# Patient Record
Sex: FEMALE | Race: WHITE | NOT HISPANIC OR LATINO | Employment: FULL TIME | URBAN - METROPOLITAN AREA
[De-identification: names, ages, dates, MRNs, and addresses within clinical notes are randomized per-mention and may not be internally consistent; named-entity substitution may affect disease eponyms.]

---

## 2022-11-11 ENCOUNTER — TELEPHONE (OUTPATIENT)
Dept: PSYCHIATRY | Facility: CLINIC | Age: 27
End: 2022-11-11

## 2022-11-11 NOTE — TELEPHONE ENCOUNTER
Pt called and left a voicemail wanting to be a new pt   Writer returned the call and was able to see the pt was already on the wait list

## 2022-11-11 NOTE — TELEPHONE ENCOUNTER
Pt reached out to schedule services for med mgmt and talk therapy  Pt discussed concerns so I informed pt with a couple options   Pt added to the med mgmt and talk therapy waitlist

## 2022-11-17 ENCOUNTER — TELEPHONE (OUTPATIENT)
Dept: PSYCHIATRY | Facility: CLINIC | Age: 27
End: 2022-11-17

## 2022-11-17 NOTE — TELEPHONE ENCOUNTER
Pt called in regards to verify if she is still in the wait list  Marlyn Mo was able to find her on the wait list and informed her that, as soon as there is an opening for her, she will received a call

## 2023-03-29 ENCOUNTER — TELEPHONE (OUTPATIENT)
Dept: PSYCHIATRY | Facility: CLINIC | Age: 28
End: 2023-03-29

## 2024-09-13 LAB — EXTERNAL HIV SCREEN: NORMAL

## 2024-11-02 ENCOUNTER — APPOINTMENT (EMERGENCY)
Dept: RADIOLOGY | Facility: HOSPITAL | Age: 29
End: 2024-11-02
Payer: COMMERCIAL

## 2024-11-02 ENCOUNTER — HOSPITAL ENCOUNTER (EMERGENCY)
Facility: HOSPITAL | Age: 29
Discharge: HOME | End: 2024-11-02
Attending: EMERGENCY MEDICINE
Payer: COMMERCIAL

## 2024-11-02 VITALS
RESPIRATION RATE: 18 BRPM | HEART RATE: 110 BPM | DIASTOLIC BLOOD PRESSURE: 80 MMHG | SYSTOLIC BLOOD PRESSURE: 153 MMHG | WEIGHT: 150 LBS | OXYGEN SATURATION: 96 %

## 2024-11-02 DIAGNOSIS — J18.9 PNEUMONIA: Primary | ICD-10-CM

## 2024-11-02 DIAGNOSIS — R50.9 FEBRILE ILLNESS: ICD-10-CM

## 2024-11-02 LAB
FLUAV AG UPPER RESP QL IA.RAPID: NEGATIVE
FLUBV AG UPPER RESP QL IA.RAPID: NEGATIVE
SARS-COV+SARS-COV-2 AG RESP QL IA.RAPID: NEGATIVE

## 2024-11-02 PROCEDURE — 87804 INFLUENZA ASSAY W/OPTIC: CPT

## 2024-11-02 PROCEDURE — 87811 SARS-COV-2 COVID19 W/OPTIC: CPT

## 2024-11-02 PROCEDURE — 93005 ELECTROCARDIOGRAM TRACING: CPT

## 2024-11-02 PROCEDURE — 99283 EMERGENCY DEPT VISIT LOW MDM: CPT

## 2024-11-02 PROCEDURE — 71046 X-RAY EXAM CHEST 2 VIEWS: CPT

## 2024-11-02 PROCEDURE — 99284 EMERGENCY DEPT VISIT MOD MDM: CPT | Performed by: EMERGENCY MEDICINE

## 2024-11-02 RX ORDER — DOXYCYCLINE 100 MG/1
100 CAPSULE ORAL ONCE
Status: DISCONTINUED | OUTPATIENT
Start: 2024-11-02 | End: 2024-11-02 | Stop reason: HOSPADM

## 2024-11-02 RX ORDER — LIDOCAINE HYDROCHLORIDE 20 MG/ML
15 SOLUTION OROPHARYNGEAL ONCE
Status: COMPLETED | OUTPATIENT
Start: 2024-11-02 | End: 2024-11-02

## 2024-11-02 RX ORDER — ACETAMINOPHEN 325 MG/1
650 TABLET ORAL ONCE
Status: DISCONTINUED | OUTPATIENT
Start: 2024-11-02 | End: 2024-11-02

## 2024-11-02 RX ORDER — ALBUTEROL SULFATE 90 UG/1
2 INHALANT RESPIRATORY (INHALATION) EVERY 6 HOURS PRN
Qty: 8.5 G | Refills: 0 | Status: SHIPPED | OUTPATIENT
Start: 2024-11-02

## 2024-11-02 RX ORDER — IBUPROFEN 600 MG/1
600 TABLET, FILM COATED ORAL ONCE
Status: COMPLETED | OUTPATIENT
Start: 2024-11-02 | End: 2024-11-02

## 2024-11-02 RX ORDER — IBUPROFEN 400 MG/1
400 TABLET, FILM COATED ORAL EVERY 6 HOURS PRN
Qty: 50 TABLET | Refills: 0 | Status: SHIPPED | OUTPATIENT
Start: 2024-11-02

## 2024-11-02 RX ORDER — ACETAMINOPHEN 325 MG/1
650 TABLET ORAL EVERY 6 HOURS PRN
Qty: 30 TABLET | Refills: 0 | Status: SHIPPED | OUTPATIENT
Start: 2024-11-02

## 2024-11-02 RX ORDER — OXYMETAZOLINE HYDROCHLORIDE 0.05 G/100ML
2 SPRAY NASAL ONCE
Status: COMPLETED | OUTPATIENT
Start: 2024-11-02 | End: 2024-11-02

## 2024-11-02 RX ORDER — ACETAMINOPHEN 325 MG/1
975 TABLET ORAL ONCE
Status: COMPLETED | OUTPATIENT
Start: 2024-11-02 | End: 2024-11-02

## 2024-11-02 RX ORDER — DOXYCYCLINE 100 MG/1
100 CAPSULE ORAL 2 TIMES DAILY
Qty: 14 CAPSULE | Refills: 0 | Status: SHIPPED | OUTPATIENT
Start: 2024-11-02 | End: 2024-11-09

## 2024-11-02 RX ADMIN — LIDOCAINE HYDROCHLORIDE 15 ML: 20 SOLUTION ORAL at 07:59

## 2024-11-02 RX ADMIN — ACETAMINOPHEN 975 MG: 325 TABLET, FILM COATED ORAL at 07:57

## 2024-11-02 RX ADMIN — IBUPROFEN 600 MG: 600 TABLET, FILM COATED ORAL at 07:58

## 2024-11-02 RX ADMIN — OXYMETAZOLINE HCL 2 SPRAY: 0.05 SPRAY NASAL at 08:00

## 2024-11-02 RX ADMIN — DEXAMETHASONE 10 MG: 2 TABLET ORAL at 07:57

## 2024-11-02 NOTE — DISCHARGE INSTRUCTIONS
Please follow up with your PCP. You have been prescribed Doxycycline (an antibiotic).  Please take this TWICE per day for the next SEVEN days.  Do not skip doses.  Take the full course of medication as prescribed.  Side effects of this medication include nausea, vomiting, and diarrhea - take this medication with some food and a glass of water.  You may take this with a probiotic to try and prevent the symptoms. Do not take with supplements or multivitamins. DO NOT GO IN THE SUN WHILE TAKING THIS MEDICATION.    Please return to the Emergency Department if you experience worsening of your current symptoms, difficulty breathing, inability to eat or drink, dehydration, turning blue, or any new/other concerning symptoms.

## 2024-11-02 NOTE — Clinical Note
Britat Byrd was seen and treated in our emergency department on 11/2/2024.                Diagnosis: Pneumonia    Britta  is off the rest of the shift today.    She may return on this date: 11/06/2024         If you have any questions or concerns, please don't hesitate to call.      Nat Rhodes, DO    ______________________________           _______________          _______________  Hospital Representative                              Date                                Time Provider E-Visit time total (minutes): 3

## 2024-11-02 NOTE — ED ATTENDING ATTESTATION
Final Diagnoses:     1. Pneumonia    2. Febrile illness      ED Course as of 11/03/24 0705   Sat Nov 02, 2024   0728 Procedure Note: EKG  Date/Time: 11/02/24 7:28 AM   Interpreted by: KAL MATHEWS   Indications / Diagnosis: dyspnea  ECG reviewed by me, the ED Provider: yes   The EKG demonstrates:   Rhythm:  normal sinus  Intervals: normal intervals  Axis: normal axis  QRS/Blocks: normal QRS  ST Changes: No acute ST Changes, no STD/TAWNY.  No old for comparison.     IKal MD, saw and evaluated the patient. All available labs and X-rays were ordered by me or the resident / non-physician and have been reviewed by myself. I discussed the patient with the resident / non-physician and agree with the resident's / non-physician practitioner's findings and plan as documented in the resident's / non-physician practicitioner's note, except where noted.   At this point, I agree with the current assessment done in the ED.   I was present during key portions of all procedures performed unless otherwise stated.     HPI:  NURSING TRIAGE:    This is a 29 y.o. female presenting for evaluation of viral syndrome.  Since 6 days ago has had cough congestion, feeling SOB.  No falls/trauma  No sick contacts  She has used her inhaler maybe more frequently during this time, last use before coming.  No dizziness/LH  No other meds used.  PMH: exercise-induced asthma.  PSH: none  No smoking drinking drugs  Chief Complaint   Patient presents with    Flu Symptoms     Pt reports that symptoms started on Sunday. Pt reports headache, chills, cough, CP, and SOB. Pt reports 2 months of UTI symptoms has a CT scan scheduled this week.       PHYSICAL: ASSESSMENT + PLAN:   General: VSS, NAD, awake, alert. Well-nourished, well-developed. Appears stated age.   Speaking normally in full sentences.   Head: Normocephalic, atraumatic, nontender.  Eyes: PERRL, EOM-I. No diplopia.   No hyphema.   No subconjunctival  hemorrhages.  Symmetrical lids.   ENT: Atraumatic external nose and ears.    MMM  No malocclusion. No stridor. Normal phonation. No drooling. Normal swallowing.   Neck: Symmetric, trachea midline. No JVD.  CV: mild tachycardia  +S1/S2  No murmurs or gallops  Peripheral pulses +2 throughout. No chest wall tenderness.   Lungs:   Unlabored No retractions  Frequent coughing.  CTAB, lungs sounds equal bilateral.   No tachypnea.   Abd: +BS, soft, NT/ND.   MSK:   FROM   Back:   No rashes  Skin: Dry, intact.   Neuro: AAOx3, GCS 15, CN II-XII grossly intact.   Motor grossly intact.  Psychiatric/Behavioral: Appropriate mood and affect   Exam: deferred    Vitals:    11/02/24 0656   BP: 153/80   BP Location: Right arm   Pulse: (!) 110   Resp: 18   SpO2: 96%   Weight: 68 kg (150 lb)    Viral syndrome: tylenol/toradol  Likely DC.   Sore throat is the most bothersome ? decadron.      There are no obvious limitations to social determinants of care.   Nursing note reviewed.   Vitals reviewed.   Orders placed by myself and/or advanced practitioner / resident.    Previous chart was reviewed  No language barrier.   History obtained from patient.    There are no limitations to the history obtained:     Past Medical: Past Surgical:    has a past medical history of Asthma.  has no past surgical history on file.   Social: Cardiac (Echo/Cath)   Social History     Substance and Sexual Activity   Alcohol Use Yes     Social History     Tobacco Use   Smoking Status Never   Smokeless Tobacco Never     Social History     Substance and Sexual Activity   Drug Use Not Currently    No results found for this or any previous visit.    No results found for this or any previous visit.    No results found for this or any previous visit.     Labs: Imaging:   Labs Reviewed   COVID-19/INFLUENZA A/B RAPID ANTIGEN (30 MIN.TAT) - Normal       Result Value Ref Range Status    SARS COV Rapid Antigen Negative  Negative Final    Influenza A Rapid Antigen Negative   Negative Final    Influenza B Rapid Antigen Negative  Negative Final    Narrative:     This test has been performed using the Quidel Lizbeth 2 FLU+SARS Antigen test under the Emergency Use Authorization (EUA). This test has been validated by the  and verified by the performing laboratory. The Lizbeth uses lateral flow immunofluorescent sandwich assay to detect SARS-COV, Influenza A and Influenza B Antigen.     The Quidel Lizbeth 2 SARS Antigen test does not differentiate between SARS-CoV and SARS-CoV-2.     Negative results are presumptive and may be confirmed with a molecular assay, if necessary, for patient management. Negative results do not rule out SARS-CoV-2 or influenza infection and should not be used as the sole basis for treatment or patient management decisions. A negative test result may occur if the level of antigen in a sample is below the limit of detection of this test.     Positive results are indicative of the presence of viral antigens, but do not rule out bacterial infection or co-infection with other viruses.     All test results should be used as an adjunct to clinical observations and other information available to the provider.    FOR PEDIATRIC PATIENTS - copy/paste COVID Guidelines URL to browser: [email or web address]    XR chest 2 views   ED Interpretation   Abnormal   RML PNA      Final Result      Mild haziness in the right middle lobe may be on the basis of subsegmental atelectasis versus pneumonia.            Workstation performed: [id number]            Medications: Code Status:   Medications   ibuprofen (MOTRIN) tablet 600 mg (600 mg Oral Given 11/2/24 0758)   dexamethasone (DECADRON) tablet 10 mg (10 mg Oral Given 11/2/24 0757)   Lidocaine Viscous HCl (XYLOCAINE) 2 % mucosal solution 15 mL (15 mL Swish & Swallow Given 11/2/24 0759)   oxymetazoline (AFRIN) 0.05 % nasal spray 2 spray (2 sprays Each Nare Given 11/2/24 0800)    acetaminophen (TYLENOL) tablet 975 mg (975 mg Oral Given 11/2/24 0757)    Code Status: No Order  Advance Directive and Living Will:      Power of :    POLST:       Orders Placed This Encounter   Procedures    FLU/COVID Rapid Antigen (30 min. TAT) - Preferred screening test in ED    XR chest 2 views    ECG 12 lead     Time reflects when diagnosis was documented in both MDM as applicable and the Disposition within this note       Time User Action Codes Description Comment    11/2/2024  8:43 AM Nat Harris [J18.9] Pneumonia     11/2/2024  8:43 AM Nat Harris [R50.9] Febrile illness           ED Disposition       ED Disposition   Discharge    Condition   Stable    Date/Time   Sat Nov 2, 2024  8:43 AM    Comment   Britta Byrd discharge to home/self care.                   Follow-up Information       Follow up With Specialties Details Why Contact Info Additional Information    Selene Aviles MD Internal Medicine Schedule an appointment as soon as possible for a visit   [address] [place] [address] [phone]       [employer]  Call   [phone]       [facility] Emergency Medicine Go to  If symptoms worsen [address] [place] [id number] [phone] [facility], [address] [place] [address], [address] [phone]          Discharge Medication List as of 11/2/2024  8:47 AM        START taking these medications    Details   acetaminophen (TYLENOL) 325 mg tablet Take 2 tablets (650 mg total) by mouth every 6 (six) hours as needed for mild pain or fever, Starting Sat 11/2/2024, Print      albuterol (ProAir HFA) 90 mcg/act inhaler Inhale 2 puffs every 6 (six) hours as needed for wheezing, Starting Sat 11/2/2024, Normal      doxycycline hyclate (VIBRAMYCIN) 100 mg capsule Take 1 capsule (100 mg total) by mouth 2 (two) times a day for 7 days, Starting Sat 11/2/2024,  "Until Sat 11/9/2024, Normal      ibuprofen (MOTRIN) 400 mg tablet Take 1 tablet (400 mg total) by mouth every 6 (six) hours as needed for mild pain, Starting Sat 11/2/2024, Print           No discharge procedures on file.  None                        Portions of the record may have been created with voice recognition software. Occasional wrong word or \"sound a like\" substitutions may have occurred due to the inherent limitations of voice recognition software. Read the chart carefully and recognize, using context, where substitutions have occurred.    Electronically signed by:  Kong Hernandez  "

## 2024-11-02 NOTE — ED PROVIDER NOTES
ED Disposition       None          Assessment & Plan   {Hyperlinks  Risk Stratification - NIHSS - HEART SCORE - Fill out sepsis note and make sure you call [phone] if severe or septic shock:[id number]}    MDM         Medications - No data to display    ED Risk Strat Scores                                               History of Present Illness   {Hyperlinks  History (Med, Surg, Fam, Social) - Current Medications - Allergies  :[id number]}    Chief Complaint   Patient presents with    Flu Symptoms     Pt reports that symptoms started on Sunday. Pt reports headache, chills, cough, CP, and SOB. Pt reports 2 months of UTI symptoms has a CT scan scheduled this week.        Past Medical History:   Diagnosis Date    Asthma       History reviewed. No pertinent surgical history.   History reviewed. No pertinent family history.   Social History     Tobacco Use    Smoking status: Never    Smokeless tobacco: Never   Substance Use Topics    Alcohol use: Yes    Drug use: Not Currently      E-Cigarette/Vaping      E-Cigarette/Vaping Substances      I have reviewed and agree with the history as documented.     HPI    Review of Systems        Objective   {Hyperlinks  Historical Vitals - Historical Labs - Chart Review/Microbiology - Last Echo - Code Status  :[id number]}    ED Triage Vitals [11/02/24 0656]   Temp Pulse Blood Pressure Respirations SpO2 Patient Position - Orthostatic VS   -- (!) 110 153/80 18 96 % Lying      Temp src Heart Rate Source BP Location FiO2 (%) Pain Score    -- Monitor Right arm -- 6      Vitals      Date and Time Temp Pulse SpO2 Resp BP Pain Score FACES Pain Rating User   11/02/24 0656 -- 110 96 % 18 153/80 6 -- CS            Physical Exam    Results Reviewed       None            No orders to display       Procedures    ED Medication and Procedure Management   None     Patient's Medications    No medications on file     No discharge procedures on file.  ED SEPSIS DOCUMENTATION          or more drinks on one occassion? 0 Filed at: 11/02/2024 0806   Audit-C Score 0 Filed at: 11/02/2024 0806   RACHEL: How many times in the past year have you...    Used an illegal drug or used a prescription medication for non-medical reasons? Never Filed at: 11/02/2024 0806                            History of Present Illness       Chief Complaint   Patient presents with    Flu Symptoms     Pt reports that symptoms started on Sunday. Pt reports headache, chills, cough, CP, and SOB. Pt reports 2 months of UTI symptoms has a CT scan scheduled this week.        Past Medical History:   Diagnosis Date    Asthma       History reviewed. No pertinent surgical history.   History reviewed. No pertinent family history.   Social History     Tobacco Use    Smoking status: Never    Smokeless tobacco: Never   Substance Use Topics    Alcohol use: Yes    Drug use: Not Currently      E-Cigarette/Vaping      E-Cigarette/Vaping Substances      I have reviewed and agree with the history as documented.     HPI    Patient is a 29 y.o. female with PMHx asthma who presents to the ED for evaluation of cough with shortness of breath as well as congestion, sore throat, and subjective fevers for the past six days. Patient reports increased inhaler use with intermittent and short lasting relief. No known sick contacts, recent antibiotic use, or smoking. Denies abdominal pain, nausea, vomiting, diarrhea, hematuria, rashes, or any other complaints or concerns at this time.    Review of Systems    All other systems reviewed and negative unless otherwise stated in HPI above.    Objective       ED Triage Vitals [11/02/24 0656]   Temp Pulse Blood Pressure Respirations SpO2 Patient Position - Orthostatic VS   -- (!) 110 153/80 18 96 % Lying      Temp src Heart Rate Source BP Location FiO2 (%) Pain Score    -- Monitor Right arm -- 6      Vitals      Date and Time Temp Pulse SpO2 Resp BP Pain Score FACES Pain Rating User   11/02/24 0656 -- 110 96 % 18  153/80 6 -- CS            Physical Exam  General: NAD, awake, alert.  Head: Normocephalic, atraumatic.  Eyes: EOM-I. PERRL. No scleral icterus.  ENT: Atraumatic external nose and ears. No stridor. Normal phonation.   Neck: Symmetric, trachea midline. No JVD.   CV: +tachycardia, RR. No murmurs or gallops. Peripheral pulses +2 throughout.  Lungs: Unlabored. No retractions. No tachypnea. +wheezing, lungs sounds equal bilateral.   Abd: Flat, nondistended. +BS, soft, nontender.  MSK: FROM, no deformity/injury.  Skin: Warm, dry. No rashes.  Neuro: No focal deficits. Strength and sensation grossly intact.    Results Reviewed       Procedure Component Value Units Date/Time    FLU/COVID Rapid Antigen (30 min. TAT) - Preferred screening test in ED [[id number]]  (Normal) Collected: 11/02/24 0804    Lab Status: Final result Specimen: Nares from Nose Updated: 11/02/24 0828     SARS COV Rapid Antigen Negative     Influenza A Rapid Antigen Negative     Influenza B Rapid Antigen Negative    Narrative:      This test has been performed using the Quidel Lizbeth 2 FLU+SARS Antigen test under the Emergency Use Authorization (EUA). This test has been validated by the  and verified by the performing laboratory. The Lizbeth uses lateral flow immunofluorescent sandwich assay to detect SARS-COV, Influenza A and Influenza B Antigen.     The Quidel Lizbeth 2 SARS Antigen test does not differentiate between SARS-CoV and SARS-CoV-2.     Negative results are presumptive and may be confirmed with a molecular assay, if necessary, for patient management. Negative results do not rule out SARS-CoV-2 or influenza infection and should not be used as the sole basis for treatment or patient management decisions. A negative test result may occur if the level of antigen in a sample is below the limit of detection of this test.     Positive results are indicative of the presence of viral antigens, but do not rule out bacterial infection or co-infection  with other viruses.     All test results should be used as an adjunct to clinical observations and other information available to the provider.    FOR PEDIATRIC PATIENTS - copy/paste COVID Guidelines URL to browser: [email or web address]            XR chest 2 views   ED Interpretation by Kong Hernandez MD (11/02 0840)   Abnormal   RML PNA      Final Interpretation by Orlando Oropeza MD (11/02 5448)      Mild haziness in the right middle lobe may be on the basis of subsegmental atelectasis versus pneumonia.            Workstation performed: [id number]             Procedures    ED Medication and Procedure Management   None     Discharge Medication List as of 11/2/2024  8:47 AM        START taking these medications    Details   acetaminophen (TYLENOL) 325 mg tablet Take 2 tablets (650 mg total) by mouth every 6 (six) hours as needed for mild pain or fever, Starting Sat 11/2/2024, Print      albuterol (ProAir HFA) 90 mcg/act inhaler Inhale 2 puffs every 6 (six) hours as needed for wheezing, Starting Sat 11/2/2024, Normal      doxycycline hyclate (VIBRAMYCIN) 100 mg capsule Take 1 capsule (100 mg total) by mouth 2 (two) times a day for 7 days, Starting Sat 11/2/2024, Until Sat 11/9/2024, Normal      ibuprofen (MOTRIN) 400 mg tablet Take 1 tablet (400 mg total) by mouth every 6 (six) hours as needed for mild pain, Starting Sat 11/2/2024, Print           No discharge procedures on file.  ED SEPSIS DOCUMENTATION   Time reflects when diagnosis was documented in both MDM as applicable and the Disposition within this note       Time User Action Codes Description Comment    11/2/2024  8:43 AM Nat Harris [J18.9] Pneumonia     11/2/2024  8:43 AM Nat Harris [R50.9] Febrile illness                  Nat Harris,   11/20/24 0979

## 2024-11-03 LAB
ATRIAL RATE: 100 BPM
P AXIS: 63 DEGREES
PR INTERVAL: 132 MS
QRS AXIS: 76 DEGREES
QRSD INTERVAL: 80 MS
QT INTERVAL: 350 MS
QTC INTERVAL: 451 MS
T WAVE AXIS: 43 DEGREES
VENTRICULAR RATE: 100 BPM

## 2024-11-03 PROCEDURE — 93010 ELECTROCARDIOGRAM REPORT: CPT | Performed by: INTERNAL MEDICINE

## 2024-11-03 RX ORDER — LIDOCAINE HYDROCHLORIDE 20 MG/ML
15 SOLUTION OROPHARYNGEAL 4 TIMES DAILY PRN
Qty: 100 ML | Refills: 0 | Status: SHIPPED | OUTPATIENT
Start: 2024-11-03 | End: 2024-11-08

## 2024-11-08 ENCOUNTER — HOSPITAL ENCOUNTER (OUTPATIENT)
Dept: RADIOLOGY | Facility: HOSPITAL | Age: 29
Discharge: HOME | End: 2024-11-08
Attending: ORTHOPAEDIC SURGERY
Payer: COMMERCIAL

## 2024-11-08 ENCOUNTER — OFFICE VISIT (OUTPATIENT)
Dept: OBGYN CLINIC | Facility: HOSPITAL | Age: 29
End: 2024-11-08
Payer: COMMERCIAL

## 2024-11-08 VITALS
DIASTOLIC BLOOD PRESSURE: 84 MMHG | WEIGHT: 149.91 LBS | HEIGHT: 64 IN | SYSTOLIC BLOOD PRESSURE: 119 MMHG | BODY MASS INDEX: 25.59 KG/M2 | HEART RATE: 74 BPM

## 2024-11-08 DIAGNOSIS — R52 PAIN: ICD-10-CM

## 2024-11-08 DIAGNOSIS — M46.1 SACROILIITIS (HCC): ICD-10-CM

## 2024-11-08 DIAGNOSIS — R52 PAIN: Primary | ICD-10-CM

## 2024-11-08 PROCEDURE — 99204 OFFICE O/P NEW MOD 45 MIN: CPT | Performed by: ORTHOPAEDIC SURGERY

## 2024-11-08 PROCEDURE — 72082 X-RAY EXAM ENTIRE SPI 2/3 VW: CPT

## 2024-11-08 RX ORDER — DIPHENOXYLATE HYDROCHLORIDE AND ATROPINE SULFATE 2.5; .025 MG/1; MG/1
1 TABLET ORAL DAILY
COMMUNITY

## 2024-11-08 RX ORDER — PREDNISONE 20 MG/1
2 TABLET ORAL DAILY
COMMUNITY
Start: 2024-11-04

## 2024-11-08 RX ORDER — VALACYCLOVIR HYDROCHLORIDE 1 G/1
1 TABLET, FILM COATED ORAL DAILY
COMMUNITY
Start: 2024-10-11

## 2024-11-08 NOTE — PROGRESS NOTES
Assessment & Plan/Medical Decision Makin y.o. female with Back Pain and imaging findings most notable for lumbar spondylosis         The clinical, physical and imaging findings were reviewed with the patient.  Britta  has a constellation of findings consistent with Sacroiliac Joint Dysfunction in the setting of lumbar degenerative disease, mild scoliosis.      Fortunately patient remains neurologically intact and functional.  Physical exam showing SI TTP.  We discussed the treatment options including physical therapy, at home exercises, activity modifications, chiropractic medicine, oral medications, interventional spine procedures.  At this time recommend continued conservative treatments.    Referral placed for physical therapy to work on core strengthening, lumbar ROM, strengthening, and stretching exercises.  Referral to pain management for evaluation and treatment. Discussed potential role of steroid injection at or near the source of pain to provide targeted relief.  Patient fitted for SI belt brace at today's visit. Discussed purpose of brace, proper usage, and brace care.     Patient instructed to return to office/ER sooner if symptoms are not improving, getting worse, or new worrisome/neurologic symptoms arise.  Patient will follow up in approx. 3 months for re-evaluation after further conservative treatments.       Subjective:      Chief Complaint: Back Pain    HPI:  Britta Byrd is a 29 y.o. female presenting for initial visit with chief complaint of low back pain, left sided.  Pain began 6 months.  Reports that pain is worse with sitting for prolonged periods.  She is an [place]  and this affects her throughout the day/night.  She does note she has lost significant amount of weight, recent surgery and diet changes along with activities.  She denies any significant numbness or tingling, no subjective weakness.  Patient has been seen and evaluated by physiatry at [facility]  network and undergone several treatments for her low back.  Denies any alona trauma. Denies fever or chills, no night sweats. Denies any bladder or bowel changes.      Objective:     History reviewed. No pertinent family history.    Past Medical History:   Diagnosis Date    Asthma        Current Outpatient Medications   Medication Sig Dispense Refill    albuterol (ProAir HFA) 90 mcg/act inhaler Inhale 2 puffs every 6 (six) hours as needed for wheezing 8.5 g 0    doxycycline hyclate (VIBRAMYCIN) 100 mg capsule Take 1 capsule (100 mg total) by mouth 2 (two) times a day for 7 days 14 capsule 0    Lidocaine Viscous HCl (XYLOCAINE) 2 % mucosal solution Swish and spit 15 mL 4 (four) times a day as needed for mouth pain or discomfort for up to 5 days 100 mL 0    multivitamin (THERAGRAN) TABS Take 1 tablet by mouth daily      predniSONE 20 mg tablet Take 2 tablets by mouth in the morning      tirzepatide (Zepbound) 2.5 mg/0.5 mL auto-injector Inject 2.5 mg under the skin once a week 1 a week      valACYclovir (VALTREX) 1,000 mg tablet Take 1 tablet by mouth in the morning      acetaminophen (TYLENOL) 325 mg tablet Take 2 tablets (650 mg total) by mouth every 6 (six) hours as needed for mild pain or fever (Patient not taking: Reported on 11/8/2024) 30 tablet 0    ibuprofen (MOTRIN) 400 mg tablet Take 1 tablet (400 mg total) by mouth every 6 (six) hours as needed for mild pain (Patient not taking: Reported on 11/8/2024) 50 tablet 0     No current facility-administered medications for this visit.       History reviewed. No pertinent surgical history.    Social History     Socioeconomic History    Marital status: Single     Spouse name: Not on file    Number of children: Not on file    Years of education: Not on file    Highest education level: Not on file   Occupational History    Not on file   Tobacco Use    Smoking status: Never    Smokeless tobacco: Never   Substance and Sexual Activity    Alcohol use: Yes    Drug use: Not  Currently    Sexual activity: Not on file   Other Topics Concern    Not on file   Social History Narrative    Not on file     Social Determinants of Health     Financial Resource Strain: Patient Declined (6/25/2024)    Received from [facility]    Overall Financial Resource Strain (CARDIA)     Difficulty of Paying Living Expenses: Patient declined   Food Insecurity: Patient Declined (6/25/2024)    Received from [facility]    Hunger Vital Sign     Worried About Running Out of Food in the Last Year: Patient declined     Ran Out of Food in the Last Year: Patient declined   Transportation Needs: Patient Declined (6/25/2024)    Received from [facility]    PRAPARE - Transportation     Lack of Transportation (Medical): Patient declined     Lack of Transportation (Non-Medical): Patient declined   Physical Activity: Not on file   Stress: Patient Declined (6/25/2024)    Received from [facility]    [place] [employer] - Occupational Stress Questionnaire     Feeling of Stress : Patient declined   Social Connections: Unknown (6/25/2024)    Received from [facility]    OASIS : Social Isolation     How often do you feel lonely or isolated from those around you?: Patient declines to respond   Intimate Partner Violence: Patient Declined (6/25/2024)    Received from [facility]    Humiliation, Afraid, Rape, and Kick questionnaire     Fear of Current or Ex-Partner: Patient declined     Emotionally Abused: Patient declined     Physically Abused: Patient declined     Sexually Abused: Patient declined   Housing Stability: Patient Declined (6/25/2024)    Received from [facility]    Housing Stability Vital Sign     Unable to Pay for Housing in the Last Year: Patient declined     Number of Times Moved in the Last Year: Not on file     Homeless in the Last Year: Patient declined       Allergies  "  Allergen Reactions    Mushroom Extract Complex - Food Allergy Anaphylaxis       Review of Systems  General- denies fever/chills  HEENT- denies hearing loss or sore throat  Eyes- denies eye pain or visual disturbances, denies red eyes  Respiratory- denies cough or SOB  Cardio- denies chest pain or palpitations  GI- denies abdominal pain  Endocrine- denies urinary frequency  Urinary- denies pain with urination  Musculoskeletal- Negative except noted above  Skin- denies rashes or wounds  Neurological- denies dizziness or headache  Psychiatric- denies anxiety or difficulty concentrating    Physical Exam  /84   Pulse 74   Ht 5' 4\" (1.626 m)   Wt 68 kg (149 lb 14.6 oz)   BMI 25.73 kg/m²     General/Constitutional: No apparent distress: well-nourished and well developed.  Lymphatic: No appreciable lymphadenopathy  Respiratory: Non-labored breathing  Vascular: No edema, swelling or tenderness, except as noted in detailed exam.  Integumentary: No impressive skin lesions present, except as noted in detailed exam.  Psych: Normal mood and affect, oriented to person, place and time.  MSK: normal other than stated in HPI and exam  Gait & balance: no evidence of myelopathic gait, ambulates Independently     Lumbar spine range of motion:  -Forward flexion to 90  -Extension to neutral  -Lateral bend 25 right, 25 left  -Rotation 25 right, 25 left  There tenderness with palpation along lumbar paraspinal musculature, no midline tenderness     Neurologic:    Lower Extremity Motor Function    Right  Left    Iliopsoas  5/5  5/5    Quadriceps 5/5 5/5   Tibialis anterior  5/5  5/5    EHL  5/5  5/5    Gastroc. muscle  5/5  5/5    Heel rise  5/5  5/5    Toe rise  5/5  5/5      Sensory: light touch is intact to bilateral upper and lower extremities     Reflexes:    Right Left   Patellar 1+ 1+   Achilles 1+ 1+     Other tests:  Straight Leg Raise: negative  Elda SI: positive  GELY SI: positive  Greater troch: no tenderness " "  Internal/external hip ROM: intact, no pain   Flexion/extension knee ROM: intact, no pain   Vascular: WWP extremities, 2+DP bilateral      Diagnostic Tests   IMAGING: I have personally reviewed the images and these are my findings:  Scoliosis series spine X-rays from 11/8/2024: Mild lumbar scoliosis, overall coronal and sagittal alignment well-maintained, mild multi level lumbar spondylosis with loss of disc height,  no apparent spondylolisthesis, no appreciated lytic/blastic lesions, no obvious instability    Electronic Medical Records were reviewed including [facility] therapy notes    Procedures, if performed today     None performed       Portions of the record may have been created with voice recognition software.  Occasional wrong word or \"sound a like\" substitutions may have occurred due to the inherent limitations of voice recognition software.  Read the chart carefully and recognize, using context, where substitutions have occurred.   "

## 2025-01-25 ENCOUNTER — DOCUMENTATION (OUTPATIENT)
Dept: OTHER | Facility: HOSPITAL | Age: 30
End: 2025-01-25

## 2025-03-10 ENCOUNTER — HOSPITAL ENCOUNTER (EMERGENCY)
Facility: HOSPITAL | Age: 30
Discharge: HOME | End: 2025-03-10
Attending: EMERGENCY MEDICINE
Payer: COMMERCIAL

## 2025-03-10 VITALS
SYSTOLIC BLOOD PRESSURE: 125 MMHG | OXYGEN SATURATION: 94 % | HEART RATE: 72 BPM | DIASTOLIC BLOOD PRESSURE: 60 MMHG | TEMPERATURE: 97.7 F | RESPIRATION RATE: 18 BRPM

## 2025-03-10 DIAGNOSIS — F10.129 HANGOVER (HCC): Primary | ICD-10-CM

## 2025-03-10 PROCEDURE — 99282 EMERGENCY DEPT VISIT SF MDM: CPT

## 2025-03-10 PROCEDURE — 99284 EMERGENCY DEPT VISIT MOD MDM: CPT | Performed by: EMERGENCY MEDICINE

## 2025-03-10 RX ORDER — DROPERIDOL 2.5 MG/ML
1 INJECTION, SOLUTION INTRAMUSCULAR; INTRAVENOUS ONCE
Status: COMPLETED | OUTPATIENT
Start: 2025-03-10 | End: 2025-03-10

## 2025-03-10 RX ORDER — ONDANSETRON 4 MG/1
4 TABLET, ORALLY DISINTEGRATING ORAL ONCE
Status: DISCONTINUED | OUTPATIENT
Start: 2025-03-10 | End: 2025-03-10

## 2025-03-10 RX ORDER — ONDANSETRON 2 MG/ML
4 INJECTION INTRAMUSCULAR; INTRAVENOUS ONCE
Status: DISCONTINUED | OUTPATIENT
Start: 2025-03-10 | End: 2025-03-10 | Stop reason: HOSPADM

## 2025-03-10 NOTE — ED ATTENDING ATTESTATION
3/10/2025  I, Candelario Sellers MD, saw and evaluated the patient. I have discussed the patient with the resident/non-physician practitioner and agree with the resident's/non-physician practitioner's findings, Plan of Care, and MDM as documented in the resident's/non-physician practitioner's note, except where noted. All available labs and Radiology studies were reviewed.  I was present for key portions of any procedure(s) performed by the resident/non-physician practitioner and I was immediately available to provide assistance.       At this point I agree with the current assessment done in the Emergency Department.  I have conducted an independent evaluation of this patient a history and physical is as follows:    29-year-old woman presenting with 1 day of nausea and multiple episodes of vomiting.  No abdominal pain.  No diarrhea.  No fever.  No pain or burning with urination.  No vaginal bleeding or discharge.  She is awake and alert, mildly uncomfortable appearing.  Abdomen is soft, nontender, nondistended.  Vital signs stable.  Will get labs, administer IV fluids and antiemetics, reassess.    ED Course         Critical Care Time  Procedures

## 2025-03-10 NOTE — ED NOTES
Pt no longer in stretcher with no belongings to be found, IV pulled by patient.         Corinne D Strouse, RN  03/10/25 7531
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
## 2025-05-08 ENCOUNTER — TELEPHONE (OUTPATIENT)
Dept: ADMINISTRATIVE | Facility: OTHER | Age: 30
End: 2025-05-08

## 2025-05-08 ENCOUNTER — OFFICE VISIT (OUTPATIENT)
Age: 30
End: 2025-05-08
Payer: COMMERCIAL

## 2025-05-08 VITALS
DIASTOLIC BLOOD PRESSURE: 74 MMHG | WEIGHT: 150.6 LBS | SYSTOLIC BLOOD PRESSURE: 111 MMHG | HEART RATE: 69 BPM | TEMPERATURE: 98.1 F | OXYGEN SATURATION: 95 % | BODY MASS INDEX: 25.71 KG/M2 | RESPIRATION RATE: 16 BRPM | HEIGHT: 64 IN

## 2025-05-08 DIAGNOSIS — M41.26 OTHER IDIOPATHIC SCOLIOSIS, LUMBAR REGION: ICD-10-CM

## 2025-05-08 DIAGNOSIS — F12.90 MARIJUANA USE: ICD-10-CM

## 2025-05-08 DIAGNOSIS — F31.9 BIPOLAR AFFECTIVE DISORDER, REMISSION STATUS UNSPECIFIED (HCC): ICD-10-CM

## 2025-05-08 DIAGNOSIS — L30.9 ECZEMA, UNSPECIFIED TYPE: ICD-10-CM

## 2025-05-08 DIAGNOSIS — M54.50 CHRONIC MIDLINE LOW BACK PAIN WITHOUT SCIATICA: ICD-10-CM

## 2025-05-08 DIAGNOSIS — Z76.89 ESTABLISHING CARE WITH NEW DOCTOR, ENCOUNTER FOR: Primary | ICD-10-CM

## 2025-05-08 DIAGNOSIS — G89.29 CHRONIC MIDLINE LOW BACK PAIN WITHOUT SCIATICA: ICD-10-CM

## 2025-05-08 DIAGNOSIS — M79.673 PAIN OF FOOT, UNSPECIFIED LATERALITY: ICD-10-CM

## 2025-05-08 DIAGNOSIS — A60.00 RECURRENT GENITAL HERPES: ICD-10-CM

## 2025-05-08 DIAGNOSIS — R63.8 WEIGHT DISORDER: ICD-10-CM

## 2025-05-08 DIAGNOSIS — E04.1 THYROID NODULE: ICD-10-CM

## 2025-05-08 DIAGNOSIS — Z13.220 SCREENING FOR LIPID DISORDERS: ICD-10-CM

## 2025-05-08 DIAGNOSIS — J30.9 ALLERGIC RHINITIS, UNSPECIFIED SEASONALITY, UNSPECIFIED TRIGGER: ICD-10-CM

## 2025-05-08 PROCEDURE — 99204 OFFICE O/P NEW MOD 45 MIN: CPT | Performed by: INTERNAL MEDICINE

## 2025-05-08 RX ORDER — VALACYCLOVIR HYDROCHLORIDE 1 G/1
1000 TABLET, FILM COATED ORAL 2 TIMES DAILY
Qty: 10 TABLET | Refills: 1 | Status: SHIPPED | OUTPATIENT
Start: 2025-05-08 | End: 2025-05-13

## 2025-05-08 RX ORDER — HYDROCORTISONE VALERATE CREAM 2 MG/G
1 CREAM TOPICAL 2 TIMES DAILY
COMMUNITY

## 2025-05-08 RX ORDER — FLUTICASONE PROPIONATE 50 MCG
1 SPRAY, SUSPENSION (ML) NASAL DAILY
Qty: 15 ML | Refills: 1 | Status: SHIPPED | OUTPATIENT
Start: 2025-05-08

## 2025-05-08 NOTE — ASSESSMENT & PLAN NOTE
Patient reports she has a medical marijuana    Patient wants to quit, encourage complete cessation

## 2025-05-08 NOTE — ASSESSMENT & PLAN NOTE
9/24 US thyroid  Nodule 1   Right mid to lower nodule measuring 0.7 x 0.6 x 0.7 cm   Comparison: Previously 0.8 x 0.6 x 0.7 cm   Composition: Partially cystic with eccentric solid area   Echotexture: Hypoechoic   Margin: Regular   History of thyroid cancer in mother  Previous thyroid labs and ultrasound reviewed and discussed with the patient  Follow-up with annual thyroid ultrasound and 6 monthly thyroid labs  Patient remains asymptomatic  Orders:    CBC and differential; Future    Comprehensive metabolic panel; Future    TSH, 3rd generation with Free T4 reflex; Future    US thyroid; Future

## 2025-05-08 NOTE — ASSESSMENT & PLAN NOTE
Patient has been on Valtrex suppressive therapy for several years.  No flares up noted in the last several years and  We discussed switching from chronic suppressive therapy to as needed episodic therapy for flareups  Recommend to start medications within 24 hours of symptom onset  Orders:    CBC and differential; Future    Comprehensive metabolic panel; Future    TSH, 3rd generation with Free T4 reflex; Future    US thyroid; Future    Ambulatory Referral to Obstetrics / Gynecology; Future    valACYclovir (VALTREX) 1,000 mg tablet; Take 1 tablet (1,000 mg total) by mouth 2 (two) times a day for 5 days

## 2025-05-08 NOTE — ASSESSMENT & PLAN NOTE
Ongoing and chronic  Requests of Flonase refills  Orders:    fluticasone (FLONASE) 50 mcg/act nasal spray; 1 spray into each nostril daily

## 2025-05-08 NOTE — ASSESSMENT & PLAN NOTE
Ongoing since age 18, was briefly on Depakote which was stopped during her pregnancy, subsequently patient has been off her medication and reports doing well, she has been seeing a therapist, requests a new referral to psychotherapy  Orders:    CBC and differential; Future    Comprehensive metabolic panel; Future    TSH, 3rd generation with Free T4 reflex; Future    US thyroid; Future    Ambulatory referral to Psych Services; Future

## 2025-05-08 NOTE — ASSESSMENT & PLAN NOTE
Likely due to scoliosis/myofascial related    Recommend OTC Tylenol 500 mg every 6 hours as needed for mild pain, ibuprofen 400 mg every 8 hours as needed for moderate pain.  Patient can also use local Voltaren gel along with hot compress.  Can use local lidocaine 4% patches at night to help with sleep.  Recommend to monitor symptoms, and to follow-up in 2 weeks or earlier if needed.   Orders:    CBC and differential; Future    Comprehensive metabolic panel; Future    TSH, 3rd generation with Free T4 reflex; Future    US thyroid; Future    Ambulatory Referral to Physical Therapy; Future

## 2025-05-08 NOTE — PROGRESS NOTES
Name: Britta Byrd      : 1995      MRN: [id number]  Encounter Provider: Muna Machado MD  Encounter Date: 2025   Encounter department: [facility] PRIMARY CARE  :  Assessment & Plan  Establishing care with new doctor, encounter for    Orders:    CBC and differential; Future    Comprehensive metabolic panel; Future    TSH, 3rd generation with Free T4 reflex; Future    US thyroid; Future    Pain of foot, unspecified laterality   patient rolled over her right foot few days ago  X-ray feet at a urgent care was unremarkable for any fractures  No obvious swelling or bruises seen  Recommend cool compress and OTC ibuprofen 400 mg every 8 hours for pain  Orders:    CBC and differential; Future    Comprehensive metabolic panel; Future    TSH, 3rd generation with Free T4 reflex; Future    US thyroid; Future    Bipolar affective disorder, remission status unspecified (HCC)  Ongoing since age 18, was briefly on Depakote which was stopped during her pregnancy, subsequently patient has been off her medication and reports doing well, she has been seeing a therapist, requests a new referral to psychotherapy  Orders:    CBC and differential; Future    Comprehensive metabolic panel; Future    TSH, 3rd generation with Free T4 reflex; Future    US thyroid; Future    Ambulatory referral to Psych Services; Future    Thyroid nodule   US thyroid  Nodule 1   Right mid to lower nodule measuring 0.7 x 0.6 x 0.7 cm   Comparison: Previously 0.8 x 0.6 x 0.7 cm   Composition: Partially cystic with eccentric solid area   Echotexture: Hypoechoic   Margin: Regular   History of thyroid cancer in mother  Previous thyroid labs and ultrasound reviewed and discussed with the patient  Follow-up with annual thyroid ultrasound and 6 monthly thyroid labs  Patient remains asymptomatic  Orders:    CBC and differential; Future    Comprehensive metabolic panel; Future    TSH, 3rd generation with Free T4 reflex; Future     US thyroid; Future    Screening for lipid disorders    Orders:    CBC and differential; Future    Comprehensive metabolic panel; Future    Lipid panel; Future    TSH, 3rd generation with Free T4 reflex; Future    US thyroid; Future    Weight disorder  Patient reports 100 pound weight loss over the last 2 years, with lifestyle modification, has been on Zepbound x 1 year, she receives medications from the health clinic and  Continue to monitor weight  Orders:    CBC and differential; Future    Comprehensive metabolic panel; Future    TSH, 3rd generation with Free T4 reflex; Future    US thyroid; Future    Other idiopathic scoliosis, lumbar region  Follows up with orthopedics  Has a low backache and requests a prescription for physical therapy  Orders:    CBC and differential; Future    Comprehensive metabolic panel; Future    TSH, 3rd generation with Free T4 reflex; Future    US thyroid; Future    Ambulatory Referral to Physical Therapy; Future    Chronic midline low back pain without sciatica  Likely due to scoliosis/myofascial related    Recommend OTC Tylenol 500 mg every 6 hours as needed for mild pain, ibuprofen 400 mg every 8 hours as needed for moderate pain.  Patient can also use local Voltaren gel along with hot compress.  Can use local lidocaine 4% patches at night to help with sleep.  Recommend to monitor symptoms, and to follow-up in 2 weeks or earlier if needed.   Orders:    CBC and differential; Future    Comprehensive metabolic panel; Future    TSH, 3rd generation with Free T4 reflex; Future    US thyroid; Future    Ambulatory Referral to Physical Therapy; Future    Eczema, unspecified type  Uses hydrocortisone 0.2% cream for flareups  Counseled for cautious use of steroid cream, limit use to less than 1 week, avoid  facial application.  Patient verbalized understand  Orders:    CBC and differential; Future    Comprehensive metabolic panel; Future    TSH, 3rd generation with Free T4 reflex; Future    US  thyroid; Future    Recurrent genital herpes  Patient has been on Valtrex suppressive therapy for several years.  No flares up noted in the last several years and  We discussed switching from chronic suppressive therapy to as needed episodic therapy for flareups  Recommend to start medications within 24 hours of symptom onset  Orders:    CBC and differential; Future    Comprehensive metabolic panel; Future    TSH, 3rd generation with Free T4 reflex; Future    US thyroid; Future    Ambulatory Referral to Obstetrics / Gynecology; Future    valACYclovir (VALTREX) 1,000 mg tablet; Take 1 tablet (1,000 mg total) by mouth 2 (two) times a day for 5 days    Marijuana use  Patient reports she has a medical marijuana    Patient wants to quit, encourage complete cessation       Allergic rhinitis, unspecified seasonality, unspecified trigger  Ongoing and chronic  Requests of Flonase refills  Orders:    fluticasone (FLONASE) 50 mcg/act nasal spray; 1 spray into each nostril daily           History of Present Illness   HPI    Patient comes to establish care, she is transitioning from her previous provider.    Reports history of bipolar disease, was on Depakote several years ago which was stopped due to pregnancy, currently off of medication and doing well with psychotherapy.  She also has a history of lumbar scoliosis evaluated by [facility], reports a chronic backache secondary to that      Past Medical History   Past Medical History:   Diagnosis Date    Asthma      History reviewed. No pertinent surgical history.  History reviewed. No pertinent family history.   reports that she has never smoked. She has never used smokeless tobacco. She reports current alcohol use. She reports that she does not currently use drugs.  Current Outpatient Medications   Medication Instructions    acetaminophen (TYLENOL) 650 mg, Oral, Every 6 hours PRN    albuterol (ProAir HFA) 90 mcg/act inhaler 2 puffs, Inhalation, Every 6 hours PRN     "fluticasone (FLONASE) 50 mcg/act nasal spray 1 spray, Nasal, Daily    hydrocortisone (WESTCORT) 0.2 % cream 1 Application, 2 times daily    ibuprofen (MOTRIN) 400 mg, Oral, Every 6 hours PRN    multivitamin (THERAGRAN) TABS 1 tablet, Daily    tirzepatide (ZEPBOUND) 2.5 mg, Weekly    valACYclovir (VALTREX) 1,000 mg, Oral, 2 times daily     Allergies   Allergen Reactions    Mushroom Extract Complex - Food Allergy Anaphylaxis      Review of Systems   Constitutional:  Negative for appetite change, chills, diaphoresis, fatigue, fever and unexpected weight change.   Respiratory:  Negative for apnea, cough, choking, chest tightness, shortness of breath, wheezing and stridor.    Cardiovascular:  Negative for chest pain, palpitations and leg swelling.   Gastrointestinal:  Negative for abdominal distention, abdominal pain, anal bleeding, blood in stool, constipation, diarrhea, nausea and vomiting.   Genitourinary:  Negative for decreased urine volume, difficulty urinating, frequency and urgency.   Musculoskeletal:  Negative for arthralgias, back pain and myalgias.   Neurological:  Negative for dizziness, light-headedness, numbness and headaches.       Objective   /74 (Patient Position: Sitting, Cuff Size: Adult)   Pulse 69   Temp 98.1 °F (36.7 °C) (Temporal)   Resp 16   Ht 5' 4\" (1.626 m)   Wt 68.3 kg (150 lb 9.6 oz)   SpO2 95%   BMI 25.85 kg/m²      Physical Exam  Constitutional:       General: She is not in acute distress.     Appearance: Normal appearance. She is normal weight. She is not ill-appearing, toxic-appearing or diaphoretic.   HENT:      Mouth/Throat:      Pharynx: No oropharyngeal exudate or posterior oropharyngeal erythema.   Cardiovascular:      Rate and Rhythm: Normal rate and regular rhythm.      Pulses: Normal pulses.           Dorsalis pedis pulses are 2+ on the right side and 2+ on the left side.      Heart sounds: Normal heart sounds. No murmur heard.     No gallop.   Pulmonary:      Effort: " Pulmonary effort is normal. No respiratory distress.      Breath sounds: Normal breath sounds. No stridor. No wheezing, rhonchi or rales.   Chest:      Chest wall: No tenderness.   Musculoskeletal:      Right lower leg: No edema.      Left lower leg: No edema.      Right foot: Normal range of motion.      Left foot: Normal range of motion. No deformity.   Feet:      Right foot:      Skin integrity: No erythema or warmth.      Left foot:      Skin integrity: No erythema or warmth.   Neurological:      Mental Status: She is alert and oriented to person, place, and time.

## 2025-05-08 NOTE — ASSESSMENT & PLAN NOTE
Patient reports 100 pound weight loss over the last 2 years, with lifestyle modification, has been on Zepbound x 1 year, she receives medications from the health clinic and  Continue to monitor weight  Orders:    CBC and differential; Future    Comprehensive metabolic panel; Future    TSH, 3rd generation with Free T4 reflex; Future    US thyroid; Future

## 2025-05-08 NOTE — ASSESSMENT & PLAN NOTE
Uses hydrocortisone 0.2% cream for flareups  Counseled for cautious use of steroid cream, limit use to less than 1 week, avoid  facial application.  Patient verbalized understand  Orders:    CBC and differential; Future    Comprehensive metabolic panel; Future    TSH, 3rd generation with Free T4 reflex; Future    US thyroid; Future

## 2025-05-08 NOTE — ASSESSMENT & PLAN NOTE
Follows up with orthopedics  Has a low backache and requests a prescription for physical therapy  Orders:    CBC and differential; Future    Comprehensive metabolic panel; Future    TSH, 3rd generation with Free T4 reflex; Future    US thyroid; Future    Ambulatory Referral to Physical Therapy; Future

## 2025-05-08 NOTE — ASSESSMENT & PLAN NOTE
patient rolled over her right foot few days ago  X-ray feet at a urgent care was unremarkable for any fractures  No obvious swelling or bruises seen  Recommend cool compress and OTC ibuprofen 400 mg every 8 hours for pain  Orders:    CBC and differential; Future    Comprehensive metabolic panel; Future    TSH, 3rd generation with Free T4 reflex; Future    US thyroid; Future

## 2025-05-09 NOTE — TELEPHONE ENCOUNTER
Please set up for Zometa 4 mg IV SS every 6 months x3 years   Upon review of the In Basket request we were able to locate, review, and update the patient chart as requested for HIV.    Any additional questions or concerns should be emailed to the Practice Liaisons via the appropriate education email address, please do not reply via In Basket.    Thank you  Mahogany Mac MA   [facility]

## 2025-05-22 ENCOUNTER — EVALUATION (OUTPATIENT)
Dept: PHYSICAL THERAPY | Facility: REHABILITATION | Age: 30
End: 2025-05-22
Attending: INTERNAL MEDICINE
Payer: COMMERCIAL

## 2025-05-22 DIAGNOSIS — M54.50 CHRONIC MIDLINE LOW BACK PAIN WITHOUT SCIATICA: Primary | ICD-10-CM

## 2025-05-22 DIAGNOSIS — G89.29 CHRONIC MIDLINE LOW BACK PAIN WITHOUT SCIATICA: Primary | ICD-10-CM

## 2025-05-22 PROCEDURE — 97161 PT EVAL LOW COMPLEX 20 MIN: CPT

## 2025-05-22 NOTE — PROGRESS NOTES
PT Evaluation     Today's date: 2025  Patient name: Britta Byrd  : 1995  MRN: [id number]  Referring provider: Muna Machado MD  Dx:   Encounter Diagnosis     ICD-10-CM    1. Chronic midline low back pain without sciatica  M54.50     G89.29           Start Time: 0845  Stop Time: 927  Total time in clinic (min): 42 minutes    Assessment  Impairments: impaired physical strength, lacks appropriate home exercise program, pain with function and poor posture     Assessment details: Britta Byrd is a pleasant 30 y.o. female who presents with chronic LBP. Upon eval today, she has poor sitting posture, poor lumbopelvic stability and control, and decreased B hip abd/ext strength resulting in worry over not knowing what's wrong and fear of not being able to keep active.  No further referral appears necessary at this time based upon examination results.  I expect she will improve within 6 weeks of skilled postural stability and core strengthening training.  Positive prognostic indicators include positive attitude toward recovery and good understanding of diagnosis and treatment plan options.  Negative prognostic indicators include chronicity of symptoms and multiple concurrent orthopedic problems.      Comparable signs:  1) Sx reduction with postural correction  2) End range l/s ext    Problem List:  1) Poor sitting posture  2) Poor lumbopelvic stability and control  3) Poor hip abd/ext  Understanding of Dx/Px/POC: excellent     Prognosis: excellent    Goals  Impairment based goals  Patient will improve B hip strength to 4+/5 in all planes within 4 weeks in order to improve ease and stability with functional mobility.  Patient will improve B hip strength to 5/5 in all planes by time of d/c in order to improve ease and stability with functional mobility.  Patient will report 50% reduction in pain within 3 weeks.  Patient will tolerate a treatment session centered around addressing deficits of  strength and mobility with min c/o pain.  Patient will consistently demonstrate proper upright seated and standing posture during treatment sessions centered around improving deficits of strength and mobility.        Functional based goals to be completed by time of d/c  Patient will improve FOTO to greater than predicted value.  Patient will be independent with home exercise program.   Patient will be able to manage symptoms independently.     Plan    Planned therapy interventions: abdominal trunk stabilization, activity modification, ADL training, balance/weight bearing training, gait training, home exercise program, therapeutic exercise, therapeutic activities, stretching, strengthening, patient education, neuromuscular re-education, postural training, therapeutic training, joint mobilization, IASTM, kinesiology taping, manual therapy, massage, Hugo taping, motor coordination training, muscle pump exercises, nerve gliding, self care, work reintegration, fine motor coordination training, flexibility, functional ROM exercises, graded activity, graded exercise, graded motor, IADL retraining, balance, behavior modification, body mechanics training, breathing training, transfer training and coordination    Frequency: 2x week  Duration in weeks: 12  Treatment plan discussed with: patient        Subjective Evaluation    History of Present Illness  Mechanism of injury: 2025: Britta Byrd is a pleasant 30 y.o. female presenting to PT for chronic LBP. She reports initial onset ~1 year ago. Her pain is worst with sitting long distances.    Patient Goals  Patient goals for therapy: increased strength, return to sport/leisure activities, increased motion and decreased pain    Pain  Current pain ratin  At best pain ratin  At worst pain ratin  Location: L sided low back  Quality: inflamed/hot/stretching.  Alleviating factors: heating pad, movement.  Progression: improved    Social  Support    Employment status: working ()  Exercise comments: works out 3-4x/week, swimming.          Objective  Imaging:      Postural Findings:     Head Position  Protracted  x Neutral   Retracted   Scapular Position  Protracted x  Neutral   Retracted   Thoracic Spine   Inc Kyphosis x Neutral       Lumbar Spine x  Inc Lordosis   Neutral  Dec Lordosis   Pelvis  x Anterior Tilt  Neutral   Posterior Tilt   Iliac Crest   L elevated  Neutral  x R elevated   Feet   Pronated x Neutral   Supinated   Lateral Shift  x Right   Left  None     Sensation:  Nerve root RIGHT  LEFT   L2 intact intact   L3 intact intact   L4  intact intact   L5 intact intact   S1 intact intact   S2 intact intact     Reflexes:   Joint / Motion  Right  Left    Patellar (L3-L4) 2+ 2+    Achilles (L5-S1) 2+ 2+        Strength and ROM evaluated B from a regional biomechanical perspective and values relevant to this episode recorded in tables below.     ROM:   Joint / Motion  Right  Left    Lumbar Flexion  100   Lumbar Extension   100 P!   Lumbar Sidebending      Lumbar rotation           Strength: MMT revealed the following findings.  Joint Motion Right: _/5 Left: _/5   Hip Flexion 5 5   Hip Abduction 4+ 4+   Hip Adduction 5 5   Hip Extension 4+ 4+   Hip ER     Hip IR     Knee Extension 5 5   Knee Flexion 5 5   Ankle Plantarflexion     Ankle Dorsiflexion             Additional Assessments:  Pain with palpation noted: no TTP  Passive intervertebral motion: no pertinent findings   Squatting: DNT  Amb: no deviations        Special Tests:  Lumbar Specific and Neural Tension  SLR Neg B   X-SLR Neg B   Slump Neg B   Prone instability    Roderick    90/90 Neg B   Prone knee flx           Treatment Based Classification: Primary stability responder              Precautions: Past Medical History[1]    Allergies[2]    POC expires Unit limit Auth Expiration date PT/OT + Visit Limit?   12 weeks - 8/14/2025 bomn Auth req bomn         Visit/Unit  Tracking  AUTH Status:  Date 5/22        Auth req Used 1         Remaining             Pertinent Findings:      POC End Date: 8/14/2025                                                                                    Test / Measure     FOTO (Predicted 69) 55   Poor lumbopelvic stability and control    Hip abd/ext 4+/5   Poor sitting posture          Access Code: [email or web address]  URL: [email or web address]  Date: 05/22/2025  Prepared by: Dominik Arthur    Exercises  - Supine Dead Bug with Leg Extension  - 1 x daily - 7 x weekly - 2 sets - 10 reps  - Bird Dog  - 1 x daily - 7 x weekly - 2 sets - 10 reps  - Side Plank on Knees  - 1 x daily - 7 x weekly - 3 sets - 30 seconds hold    Manuals 5/22                                                                Neuro Re-Ed             HEP review/pt education 25'            Bird dog             Dead bug             Side plank             Plank              Paloff with lat step                                                    Ther Ex             NS             Sitting DL             LPD with march             Front rack carry                                                                 Ther Activity                                       Gait Training                                       Modalities                                                   [1]   Past Medical History:  Diagnosis Date    Asthma    [2]   Allergies  Allergen Reactions    Mushroom Extract Complex - Food Allergy Anaphylaxis

## 2025-05-23 ENCOUNTER — TELEPHONE (OUTPATIENT)
Age: 30
End: 2025-05-23

## 2025-05-23 NOTE — TELEPHONE ENCOUNTER
Called Pt regarding a routine referral, in an attempt to verify services needed/interested, and advise of current wait list. No answer, lvm for patient to call back at [phone].    2nd attempt. Closing referral.

## 2025-05-27 ENCOUNTER — OFFICE VISIT (OUTPATIENT)
Dept: PHYSICAL THERAPY | Facility: REHABILITATION | Age: 30
End: 2025-05-27
Attending: INTERNAL MEDICINE
Payer: COMMERCIAL

## 2025-05-27 DIAGNOSIS — G89.29 CHRONIC MIDLINE LOW BACK PAIN WITHOUT SCIATICA: Primary | ICD-10-CM

## 2025-05-27 DIAGNOSIS — M54.50 CHRONIC MIDLINE LOW BACK PAIN WITHOUT SCIATICA: Primary | ICD-10-CM

## 2025-05-27 PROCEDURE — 97110 THERAPEUTIC EXERCISES: CPT

## 2025-05-27 PROCEDURE — 97112 NEUROMUSCULAR REEDUCATION: CPT

## 2025-05-27 NOTE — PROGRESS NOTES
"Daily Note     Today's date: 2025  Patient name: Britta Byrd  : 1995  MRN: [id number]  Referring provider: Muna Machado MD  Dx:   Encounter Diagnosis     ICD-10-CM    1. Chronic midline low back pain without sciatica  M54.50     G89.29           Start Time: 923  Stop Time: 946  Total time in clinic (min): 23 minutes    Subjective: Britta reports soreness from walking a lot.      Objective: See treatment diary below      Assessment: Tolerated treatment well. Restrictions in IVM with lateral hip sx reproduction noted with UPAs. Improvements in sx noted following. Did well with core stability and hip strengthening interventions. Fatigue present throughout. Patient would benefit from continued PT      Plan: Continue per plan of care.      Precautions: Past Medical History[1]    Allergies[2]    POC expires Unit limit Auth Expiration date PT/OT + Visit Limit?   12 weeks - 2025 bomn Auth req bomn         Visit/Unit Tracking  AUTH Status:  Date        Auth req Used 1 2        Remaining             Pertinent Findings:      POC End Date: 2025                                                                                    Test / Measure     FOTO (Predicted 69) 55   Poor lumbopelvic stability and control    Hip abd/ext 4+/5   Poor sitting posture          Access Code: [email or web address]  URL: [email or web address]  Date: 2025  Prepared by: Dominik Arthur    Exercises  - Supine Dead Bug with Leg Extension  - 1 x daily - 7 x weekly - 2 sets - 10 reps  - Bird Dog  - 1 x daily - 7 x weekly - 2 sets - 10 reps  - Side Plank on Knees  - 1 x daily - 7 x weekly - 3 sets - 30 seconds hold    Manuals            UPAs  L L3-L5 gr 3/4 5' CM                                                  Neuro Re-Ed             HEP review/pt education 25'            Bird dog  2x10           Dead bug  2x10            Side plank  3x30\" B modified           Plank              Paloff with lat " step  15x B mtb                                                  Ther Ex             NS  5' L5           Sitting DL             LPD with march  2x10 mtb           Front rack carry                                                                 Ther Activity                                       Gait Training                                       Modalities                                                            [1]   Past Medical History:  Diagnosis Date    Asthma    [2]   Allergies  Allergen Reactions    Mushroom Extract Complex - Food Allergy Anaphylaxis

## 2025-05-29 ENCOUNTER — OFFICE VISIT (OUTPATIENT)
Dept: PHYSICAL THERAPY | Facility: REHABILITATION | Age: 30
End: 2025-05-29
Attending: INTERNAL MEDICINE
Payer: COMMERCIAL

## 2025-05-29 DIAGNOSIS — G89.29 CHRONIC MIDLINE LOW BACK PAIN WITHOUT SCIATICA: Primary | ICD-10-CM

## 2025-05-29 DIAGNOSIS — M54.50 CHRONIC MIDLINE LOW BACK PAIN WITHOUT SCIATICA: Primary | ICD-10-CM

## 2025-05-29 PROCEDURE — 97110 THERAPEUTIC EXERCISES: CPT

## 2025-05-29 PROCEDURE — 97112 NEUROMUSCULAR REEDUCATION: CPT

## 2025-05-29 NOTE — PROGRESS NOTES
"Daily Note     Today's date: 2025  Patient name: Britta Byrd  : 1995  MRN: [id number]  Referring provider: Muna Machado MD  Dx:   Encounter Diagnosis     ICD-10-CM    1. Chronic midline low back pain without sciatica  M54.50     G89.29           Start Time: 0915  Stop Time: 0955  Total time in clinic (min): 40 minutes    Subjective: Britta reports DOMS following LV.      Objective: See treatment diary below      Assessment: Tolerated treatment well. Educated on DOMS and sx responses to exercises. Cont to progress lumbopelvic stability and core strengthening interventions with great form present throughout. Will progress as tolerated. Patient would benefit from continued PT      Plan: Continue per plan of care.      Precautions: Past Medical History[1]    Allergies[2]    POC expires Unit limit Auth Expiration date PT/OT + Visit Limit?   12 weeks - 2025 bomn Auth req bomn         Visit/Unit Tracking  AUTH Status:  Date       Auth req Used 1 2 3       Remaining             Pertinent Findings:      POC End Date: 2025                                                                                    Test / Measure     FOTO (Predicted 69) 55   Poor lumbopelvic stability and control    Hip abd/ext 4+/5   Poor sitting posture          Access Code: [email or web address]  URL: [email or web address]  Date: 2025  Prepared by: Dominik Arthur    Exercises  - Supine Dead Bug with Leg Extension  - 1 x daily - 7 x weekly - 2 sets - 10 reps  - Bird Dog  - 1 x daily - 7 x weekly - 2 sets - 10 reps  - Side Plank on Knees  - 1 x daily - 7 x weekly - 3 sets - 30 seconds hold    Manuals           UPAs  L L3-L5 gr 3/4 5' CM L L3-L5 gr 3/4 5' CM                                                 Neuro Re-Ed             HEP review/pt education 25'  5'          Bird dog  2x10 2x10          Dead bug  2x10  2x10          Side plank  3x30\" B modified 3x30\" B modified          Plank "              Yessica with lat step  15x B mtb 15x B mtb                                                 Ther Ex             NS  5' L5 5' L5          Sitting DL             LPD with march  2x10 mtb 2x10 mtb          Front rack carry                                                                 Ther Activity                                       Gait Training                                       Modalities                                                            [1]   Past Medical History:  Diagnosis Date    Asthma    [2]   Allergies  Allergen Reactions    Mushroom Extract Complex - Food Allergy Anaphylaxis

## 2025-06-03 ENCOUNTER — OFFICE VISIT (OUTPATIENT)
Dept: PHYSICAL THERAPY | Facility: REHABILITATION | Age: 30
End: 2025-06-03
Attending: INTERNAL MEDICINE
Payer: COMMERCIAL

## 2025-06-03 NOTE — PROGRESS NOTES
"Daily Note     Today's date: 6/3/2025  Patient name: Britta Byrd  : 1995  MRN: [id number]  Referring provider: Muna Machado MD  Dx:   No diagnosis found.                 Subjective: Britta reports DOMS following LV.      Objective: See treatment diary below      Assessment: Tolerated treatment well. Educated on DOMS and sx responses to exercises. Cont to progress lumbopelvic stability and core strengthening interventions with great form present throughout. Will progress as tolerated. Patient would benefit from continued PT      Plan: Continue per plan of care.      Precautions: Past Medical History[1]    Allergies[2]    POC expires Unit limit Auth Expiration date PT/OT + Visit Limit?   12 weeks - 2025 bomn Auth req bomn         Visit/Unit Tracking  AUTH Status:  Date       Auth req Used 1 2 3       Remaining             Pertinent Findings:      POC End Date: 2025                                                                                    Test / Measure     FOTO (Predicted 69) 55   Poor lumbopelvic stability and control    Hip abd/ext 4+/5   Poor sitting posture          Access Code: [email or web address]  URL: [email or web address]  Date: 2025  Prepared by: Dominik Arthur    Exercises  - Supine Dead Bug with Leg Extension  - 1 x daily - 7 x weekly - 2 sets - 10 reps  - Bird Dog  - 1 x daily - 7 x weekly - 2 sets - 10 reps  - Side Plank on Knees  - 1 x daily - 7 x weekly - 3 sets - 30 seconds hold    Manuals           UPAs  L L3-L5 gr 3/4 5' CM L L3-L5 gr 3/4 5' CM                                                 Neuro Re-Ed             HEP review/pt education 25'  5'          Bird dog  2x10 2x10          Dead bug  2x10  2x10          Side plank  3x30\" B modified 3x30\" B modified          Plank              Paloff with lat step  15x B mtb 15x B mtb                                                 Ther Ex             NS  5' L5 5' L5          Sitting " CHARLEE             LPD with march  2x10 mtb 2x10 mtb          Front rack carry                                                                 Ther Activity                                       Gait Training                                       Modalities                                                            [1]   Past Medical History:  Diagnosis Date    Asthma    [2]   Allergies  Allergen Reactions    Mushroom Extract Complex - Food Allergy Anaphylaxis

## 2025-06-05 ENCOUNTER — OFFICE VISIT (OUTPATIENT)
Dept: PHYSICAL THERAPY | Facility: REHABILITATION | Age: 30
End: 2025-06-05
Attending: INTERNAL MEDICINE
Payer: COMMERCIAL

## 2025-06-05 DIAGNOSIS — G89.29 CHRONIC MIDLINE LOW BACK PAIN WITHOUT SCIATICA: Primary | ICD-10-CM

## 2025-06-05 DIAGNOSIS — M54.50 CHRONIC MIDLINE LOW BACK PAIN WITHOUT SCIATICA: Primary | ICD-10-CM

## 2025-06-05 PROCEDURE — 97140 MANUAL THERAPY 1/> REGIONS: CPT

## 2025-06-05 PROCEDURE — 97110 THERAPEUTIC EXERCISES: CPT

## 2025-06-05 PROCEDURE — 97112 NEUROMUSCULAR REEDUCATION: CPT

## 2025-06-05 NOTE — PROGRESS NOTES
Daily Note     Today's date: 2025  Patient name: Britta Byrd  : 1995  MRN: [id number]  Referring provider: Muna Machado MD  Dx:   Encounter Diagnosis     ICD-10-CM    1. Chronic midline low back pain without sciatica  M54.50     G89.29           Start Time: 1530  Stop Time: 1610  Total time in clinic (min): 40 minutes    Subjective: Britta reports undergoing an intense strengthening class earlier this week. Body is sore but back feels good.      Objective: See treatment diary below      Assessment: Tolerated treatment well. Focused more on mobility and recovery 2/to DOMS upon arrival. Educated on proper recovery methods, lumbar anatomy, tissue end feels and sx responses to exercise.  Patient would benefit from continued PT      Plan: Continue per plan of care.      Precautions: Past Medical History[1]    Allergies[2]    POC expires Unit limit Auth Expiration date PT/OT + Visit Limit?   12 weeks - 2025 bomn Auth req bomn         Visit/Unit Tracking  AUTH Status:  Date      Auth req Used 1 2 3 4      Remaining             Pertinent Findings:      POC End Date: 2025                                                                                    Test / Measure     FOTO (Predicted 69) 55   Poor lumbopelvic stability and control    Hip abd/ext 4+/5   Poor sitting posture          Access Code: [email or web address]  URL: [email or web address]  Date: 2025  Prepared by: Dominik Arthur    Exercises  - Supine Dead Bug with Leg Extension  - 1 x daily - 7 x weekly - 2 sets - 10 reps  - Bird Dog  - 1 x daily - 7 x weekly - 2 sets - 10 reps  - Side Plank on Knees  - 1 x daily - 7 x weekly - 3 sets - 30 seconds hold    Manuals  6/5         UPAs  L L3-L5 gr 3/4 5' CM L L3-L5 gr 3/4 5' CM          B hip PROM    CM 10'                                   Neuro Re-Ed             HEP review/pt education 25'  5' 10'         Bird dog  2x10 2x10          Dead bug  2x10  " 2x10          Side plank  3x30\" B modified 3x30\" B modified          Plank              Paloff with lat step  15x B mtb 15x B mtb                                                 Ther Ex             NS  5' L5 5' L5 5' L5         Sitting DL             LPD with march  2x10 mtb 2x10 mtb          Front rack carry             Open book    15xB 10\"         PPU    2x10 5\"         Iron cross    15x B 5\"                                                             Ther Activity                                       Gait Training                                       Modalities                                                            [1]   Past Medical History:  Diagnosis Date    Asthma    [2]   Allergies  Allergen Reactions    Mushroom Extract Complex - Food Allergy Anaphylaxis     "

## 2025-06-10 ENCOUNTER — OFFICE VISIT (OUTPATIENT)
Dept: PHYSICAL THERAPY | Facility: REHABILITATION | Age: 30
End: 2025-06-10
Attending: INTERNAL MEDICINE
Payer: COMMERCIAL

## 2025-06-10 DIAGNOSIS — G89.29 CHRONIC MIDLINE LOW BACK PAIN WITHOUT SCIATICA: Primary | ICD-10-CM

## 2025-06-10 DIAGNOSIS — M54.50 CHRONIC MIDLINE LOW BACK PAIN WITHOUT SCIATICA: Primary | ICD-10-CM

## 2025-06-10 PROCEDURE — 97110 THERAPEUTIC EXERCISES: CPT

## 2025-06-10 PROCEDURE — 97112 NEUROMUSCULAR REEDUCATION: CPT

## 2025-06-10 NOTE — PROGRESS NOTES
Daily Note     Today's date: 6/10/2025  Patient name: Britta Byrd  : 1995  MRN: [id number]  Referring provider: Muna Machado MD  Dx:   Encounter Diagnosis     ICD-10-CM    1. Chronic midline low back pain without sciatica  M54.50     G89.29           Start Time: 1530  Stop Time: 1602  Total time in clinic (min): 32 minutes    Subjective: Britta reports increased LBP today. Was driving a lot yesterday and bending over in the garden.      Objective: See treatment diary below      Assessment: Tolerated treatment well. Educated on proper sitting positions and sitting TRA engagement 2/to ongoing sx with prolonged sitting. Held core stability interventions today per pt request. Sx relief in sagittal plane noted following REIL, as well as in frontal plan with the addition of side glides at wall. No sx reported post session. Patient would benefit from continued PT. 1:1 from [id number]      Plan: Continue per plan of care.      Precautions: Past Medical History[1]    Allergies[2]    POC expires Unit limit Auth Expiration date PT/OT + Visit Limit?   12 weeks - 2025 bomn Auth req bomn         Visit/Unit Tracking  AUTH Status:  Date 10    Auth req Used 1 2 3 4 5     Remaining             Pertinent Findings:      POC End Date: 2025                                                                                    Test / Measure     FOTO (Predicted 69) 55   Poor lumbopelvic stability and control    Hip abd/ext 4+/5   Poor sitting posture          Access Code: [email or web address]  URL: [email or web address]  Date: 2025  Prepared by: Dominik Arthur    Exercises  - Supine Dead Bug with Leg Extension  - 1 x daily - 7 x weekly - 2 sets - 10 reps  - Bird Dog  - 1 x daily - 7 x weekly - 2 sets - 10 reps  - Side Plank on Knees  - 1 x daily - 7 x weekly - 3 sets - 30 seconds hold    Manuals  6 6/10        UPAs  L L3-L5 gr 3/4 5' CM L L3-L5 gr 3/4 5' CM          B hip  "PROM    CM 10'                                   Neuro Re-Ed             HEP review/pt education 25'  5' 10' 10'        Bird dog  2x10 2x10          Dead bug  2x10  2x10          Side plank  3x30\" B modified 3x30\" B modified          Plank              Paloff with lat step  15x B mtb 15x B mtb                                                 Ther Ex             NS  5' L5 5' L5 5' L5 5' L5        Sitting DL             LPD with march  2x10 mtb 2x10 mtb          Front rack carry             Open book    15xB 10\"         PPU    2x10 5\" 2x10 5\", 10x with PT OP        Iron cross    15x B 5\"         Side glides at wall     2x10 3\" holds L                                               Ther Activity                                       Gait Training                                       Modalities                                                            [1]   Past Medical History:  Diagnosis Date    Asthma    [2]   Allergies  Allergen Reactions    Mushroom Extract Complex - Food Allergy Anaphylaxis     "

## 2025-06-12 ENCOUNTER — APPOINTMENT (OUTPATIENT)
Dept: PHYSICAL THERAPY | Facility: REHABILITATION | Age: 30
End: 2025-06-12
Attending: INTERNAL MEDICINE
Payer: COMMERCIAL

## 2025-06-17 ENCOUNTER — APPOINTMENT (OUTPATIENT)
Dept: PHYSICAL THERAPY | Facility: REHABILITATION | Age: 30
End: 2025-06-17
Attending: INTERNAL MEDICINE
Payer: COMMERCIAL

## 2025-06-19 ENCOUNTER — OFFICE VISIT (OUTPATIENT)
Dept: PHYSICAL THERAPY | Facility: REHABILITATION | Age: 30
End: 2025-06-19
Attending: INTERNAL MEDICINE
Payer: COMMERCIAL

## 2025-06-19 DIAGNOSIS — G89.29 CHRONIC MIDLINE LOW BACK PAIN WITHOUT SCIATICA: Primary | ICD-10-CM

## 2025-06-19 DIAGNOSIS — M54.50 CHRONIC MIDLINE LOW BACK PAIN WITHOUT SCIATICA: Primary | ICD-10-CM

## 2025-06-19 PROCEDURE — 97112 NEUROMUSCULAR REEDUCATION: CPT

## 2025-06-19 NOTE — PROGRESS NOTES
Discharge Summary    Today's date: 2025  Patient name: Britta Byrd  : 1995  MRN: [id number]  Referring provider: Muna Machado MD  Dx:   Encounter Diagnosis     ICD-10-CM    1. Chronic midline low back pain without sciatica  M54.50     G89.29             Start Time: 1547  Stop Time: 1555  Total time in clinic (min): 8 minutes    Subjective: Britta reports feeling good upon arrival.      Objective: See treatment diary below     Impairment based goals  ALL MET  Patient will improve B hip strength to 4+/5 in all planes within 4 weeks in order to improve ease and stability with functional mobility.  Patient will improve B hip strength to 5/5 in all planes by time of d/c in order to improve ease and stability with functional mobility.  Patient will report 50% reduction in pain within 3 weeks.  Patient will tolerate a treatment session centered around addressing deficits of strength and mobility with min c/o pain.  Patient will consistently demonstrate proper upright seated and standing posture during treatment sessions centered around improving deficits of strength and mobility.           Functional based goals to be completed by time of d/c ALL MET  Patient will improve FOTO to greater than predicted value.  Patient will be independent with home exercise program.   Patient will be able to manage symptoms independently.     Assessment: Patient is able to manage all sx independently and demonstrates good understanding of and proper form with each of the exercises included in HEP. Patient has met all personal and established goals for PT and is deemed safe for d/c. POC to remain open for 30 days and patient instructed to contact PT for any change in status.        Plan: Patient to be d/c from PT.     Precautions: Past Medical History[1]    Allergies[2]    POC expires Unit limit Auth Expiration date PT/OT + Visit Limit?   12 weeks - 2025 bomn Auth req bomn         Visit/Unit Tracking  AUTH  "Status:  Date 5/22 5/27 5/29 6/5 6/10 6/19   Auth req Used 1 2 3 4 5 6    Remaining             Pertinent Findings:      POC End Date: 8/14/2025                                                                                    Test / Measure     FOTO (Predicted 69) 55   Poor lumbopelvic stability and control    Hip abd/ext 4+/5   Poor sitting posture          Access Code: [email or web address]  URL: [email or web address]  Date: 05/22/2025  Prepared by: Dominik Arthur    Exercises  - Supine Dead Bug with Leg Extension  - 1 x daily - 7 x weekly - 2 sets - 10 reps  - Bird Dog  - 1 x daily - 7 x weekly - 2 sets - 10 reps  - Side Plank on Knees  - 1 x daily - 7 x weekly - 3 sets - 30 seconds hold    Manuals 5/22 5/27 5/29 6/5 6/10 6/19       UPAs  L L3-L5 gr 3/4 5' CM L L3-L5 gr 3/4 5' CM          B hip PROM    CM 10'                                   Neuro Re-Ed             HEP review/pt education 25'  5' 10' 10'        Bird dog  2x10 2x10          Dead bug  2x10  2x10          Side plank  3x30\" B modified 3x30\" B modified          Plank              Paloff with lat step  15x B mtb 15x B mtb                                                 Ther Ex             NS  5' L5 5' L5 5' L5 5' L5        Sitting DL             LPD with march  2x10 mtb 2x10 mtb          Front rack carry             Open book    15xB 10\"         PPU    2x10 5\" 2x10 5\", 10x with PT OP        Iron cross    15x B 5\"         Side glides at wall     2x10 3\" holds L                                               Ther Activity                                       Gait Training                                       Modalities                                                            [1]   Past Medical History:  Diagnosis Date    Asthma    [2]   Allergies  Allergen Reactions    Mushroom Extract Complex - Food Allergy Anaphylaxis     "

## 2025-06-24 ENCOUNTER — APPOINTMENT (OUTPATIENT)
Dept: PHYSICAL THERAPY | Facility: REHABILITATION | Age: 30
End: 2025-06-24
Attending: INTERNAL MEDICINE
Payer: COMMERCIAL

## 2025-06-26 ENCOUNTER — APPOINTMENT (OUTPATIENT)
Dept: PHYSICAL THERAPY | Facility: REHABILITATION | Age: 30
End: 2025-06-26
Attending: INTERNAL MEDICINE
Payer: COMMERCIAL

## 2025-07-06 DIAGNOSIS — J30.9 ALLERGIC RHINITIS, UNSPECIFIED SEASONALITY, UNSPECIFIED TRIGGER: ICD-10-CM

## 2025-07-07 RX ORDER — FLUTICASONE PROPIONATE 50 MCG
SPRAY, SUSPENSION (ML) NASAL
Qty: 48 G | Refills: 1 | Status: SHIPPED | OUTPATIENT
Start: 2025-07-07